# Patient Record
Sex: FEMALE | Race: WHITE
[De-identification: names, ages, dates, MRNs, and addresses within clinical notes are randomized per-mention and may not be internally consistent; named-entity substitution may affect disease eponyms.]

---

## 2023-07-12 ENCOUNTER — HOSPITAL ENCOUNTER (OUTPATIENT)
Dept: HOSPITAL 47 - EC | Age: 58
Setting detail: OBSERVATION
LOS: 1 days | Discharge: HOME | End: 2023-07-13
Attending: INTERNAL MEDICINE | Admitting: INTERNAL MEDICINE
Payer: COMMERCIAL

## 2023-07-12 DIAGNOSIS — Z79.01: ICD-10-CM

## 2023-07-12 DIAGNOSIS — I95.9: ICD-10-CM

## 2023-07-12 DIAGNOSIS — E87.5: ICD-10-CM

## 2023-07-12 DIAGNOSIS — Z98.890: ICD-10-CM

## 2023-07-12 DIAGNOSIS — Z98.891: ICD-10-CM

## 2023-07-12 DIAGNOSIS — F41.9: ICD-10-CM

## 2023-07-12 DIAGNOSIS — Z87.891: ICD-10-CM

## 2023-07-12 DIAGNOSIS — F32.A: ICD-10-CM

## 2023-07-12 DIAGNOSIS — Z79.899: ICD-10-CM

## 2023-07-12 DIAGNOSIS — E87.1: ICD-10-CM

## 2023-07-12 DIAGNOSIS — Z88.5: ICD-10-CM

## 2023-07-12 DIAGNOSIS — E78.5: ICD-10-CM

## 2023-07-12 DIAGNOSIS — R00.1: Primary | ICD-10-CM

## 2023-07-12 DIAGNOSIS — Z87.11: ICD-10-CM

## 2023-07-12 DIAGNOSIS — I10: ICD-10-CM

## 2023-07-12 DIAGNOSIS — F10.11: ICD-10-CM

## 2023-07-12 LAB
ALBUMIN SERPL-MCNC: 4.1 G/DL (ref 3.5–5)
ALP SERPL-CCNC: 95 U/L (ref 38–126)
ALT SERPL-CCNC: 40 U/L (ref 4–34)
ANION GAP SERPL CALC-SCNC: 7 MMOL/L
AST SERPL-CCNC: 42 U/L (ref 14–36)
BASOPHILS # BLD AUTO: 0 K/UL (ref 0–0.2)
BASOPHILS NFR BLD AUTO: 0 %
BUN SERPL-SCNC: 9 MG/DL (ref 7–17)
CALCIUM SPEC-MCNC: 8.8 MG/DL (ref 8.4–10.2)
CHLORIDE SERPL-SCNC: 105 MMOL/L (ref 98–107)
CO2 SERPL-SCNC: 23 MMOL/L (ref 22–30)
EOSINOPHIL # BLD AUTO: 0.1 K/UL (ref 0–0.7)
EOSINOPHIL NFR BLD AUTO: 2 %
ERYTHROCYTE [DISTWIDTH] IN BLOOD BY AUTOMATED COUNT: 4.39 M/UL (ref 3.8–5.4)
ERYTHROCYTE [DISTWIDTH] IN BLOOD: 12.5 % (ref 11.5–15.5)
GLUCOSE SERPL-MCNC: 110 MG/DL (ref 74–99)
HCT VFR BLD AUTO: 40.1 % (ref 34–46)
HGB BLD-MCNC: 13.7 GM/DL (ref 11.4–16)
LYMPHOCYTES # SPEC AUTO: 1.1 K/UL (ref 1–4.8)
LYMPHOCYTES NFR SPEC AUTO: 21 %
MAGNESIUM SPEC-SCNC: 2.1 MG/DL (ref 1.6–2.3)
MCH RBC QN AUTO: 31.1 PG (ref 25–35)
MCHC RBC AUTO-ENTMCNC: 34.1 G/DL (ref 31–37)
MCV RBC AUTO: 91.3 FL (ref 80–100)
MONOCYTES # BLD AUTO: 0.4 K/UL (ref 0–1)
MONOCYTES NFR BLD AUTO: 9 %
NEUTROPHILS # BLD AUTO: 3.3 K/UL (ref 1.3–7.7)
NEUTROPHILS NFR BLD AUTO: 65 %
PLATELET # BLD AUTO: 240 K/UL (ref 150–450)
POTASSIUM SERPL-SCNC: 5.5 MMOL/L (ref 3.5–5.1)
PROT SERPL-MCNC: 7 G/DL (ref 6.3–8.2)
SODIUM SERPL-SCNC: 135 MMOL/L (ref 137–145)
T4 FREE SERPL-MCNC: 1.05 NG/DL (ref 0.78–2.19)
WBC # BLD AUTO: 5.1 K/UL (ref 3.8–10.6)

## 2023-07-12 PROCEDURE — 84439 ASSAY OF FREE THYROXINE: CPT

## 2023-07-12 PROCEDURE — 83735 ASSAY OF MAGNESIUM: CPT

## 2023-07-12 PROCEDURE — 93005 ELECTROCARDIOGRAM TRACING: CPT

## 2023-07-12 PROCEDURE — 36415 COLL VENOUS BLD VENIPUNCTURE: CPT

## 2023-07-12 PROCEDURE — 93306 TTE W/DOPPLER COMPLETE: CPT

## 2023-07-12 PROCEDURE — 80053 COMPREHEN METABOLIC PANEL: CPT

## 2023-07-12 PROCEDURE — 85025 COMPLETE CBC W/AUTO DIFF WBC: CPT

## 2023-07-12 PROCEDURE — 84481 FREE ASSAY (FT-3): CPT

## 2023-07-12 PROCEDURE — 99284 EMERGENCY DEPT VISIT MOD MDM: CPT

## 2023-07-12 PROCEDURE — 84443 ASSAY THYROID STIM HORMONE: CPT

## 2023-07-12 RX ADMIN — DEXTROSE MONOHYDRATE AND SODIUM CHLORIDE SCH MLS/HR: 5; .9 INJECTION, SOLUTION INTRAVENOUS at 16:49

## 2023-07-12 RX ADMIN — ACETAMINOPHEN PRN MG: 325 TABLET, FILM COATED ORAL at 16:53

## 2023-07-12 NOTE — HP
HISTORY AND PHYSICAL



CHIEF COMPLAINT:

Low blood pressure and weakness.



HISTORY OF PRESENT ILLNESS:

This is a 58-year-old woman with a past medical history of multiple medical problems

including alcohol abuse, who was receiving rehab in [facility].  The

patient has high blood pressure.  The patient is taking metoprolol.  The patient is

started on lisinopril.  The patient was noted to have the blood pressure initially as

high as 190, but currently, the patient is complaining of weakness, and the blood

pressure is found to be 111/69 and pulses in the 40s.  The patient was admitted for

further evaluation and treatment.  There is no history of any fever, rigors, or chills

at this time.



PAST MEDICAL HISTORY:

Reviewed includes hypertension and hyperlipidemia.  Rest of the history and rest of the

chart are also reviewed.



HOME MEDICATIONS:

Reviewed include Catapres.  Doses and rest of the medications are reviewed.



ALLERGIES:

Codeine.



FAMILY HISTORY:

No history of heart disease or strokes in the family.



SOCIAL HISTORY:

Previous history of smoking.



REVIEW OF SYSTEMS:

Fourteen-point review is negative except as mentioned earlier.



PHYSICAL EXAMINATION:

VITAL SIGNS:  Pulse is 45, blood pressure 117/70, respirations 16.

HEENT:  Conjunctivae are normal.

NECK:  No jugular venous distention.

CARDIOVASCULAR:  S1 and S2 muffled.

RESPIRATORY:  Breath sounds diminished at the bases.  No rhonchi.  No crackles.

ABDOMEN:  Soft and nontender.  No masses palpable.

LEGS:  No edema.

NERVOUS SYSTEM:  Nonfocal.

SKIN:  No ulcers or rashes.

JOINTS:  No active deforming arthropathy.



LABORATORY DATA:

Reviewed.



ASSESSMENT:

1. Hypotension and bradycardia, possibly secondary to medications.

2. Hyponatremia.

3. Mild hyperkalemia.

4. History of EtOH.

5. Hypertension.

6. Hyperlipidemia.



RECOMMENDATIONS AND DISCUSSION:

In this 58-year-old woman presented with multiple complex medical issues, we will

monitor the patient closely.  I would recommend to avoid beta-blockers, continue with

Norvasc at this time, monitor blood pressure closely, IV fluids, repeat labs in the

morning.  Other than that, I would recommend D5 0.9 at 75 mL/hour and also obtain a

Cardiology consultation.  Further recommendations to follow.  Prognosis is guarded.





MMODL / IJN: [id number] / Job#: [id number]

## 2023-07-12 NOTE — ED
General Adult HPI





- General


Chief complaint: Recheck/Abnormal Lab/Rx


Stated complaint: blood pressure


Time Seen by Provider: 23 09:27


Source: patient, RN notes reviewed


Mode of arrival: ambulatory


Limitations: no limitations





- History of Present Illness


Initial comments: 





Patient is a pleasant 58-year-old female presenting to the emergency department 

with concerns for blood pressure and heart rate problems.  Patient does have 

history of chronic high blood pressure and does take metoprolol.  Patient has 

been at [facility] for the past 9 days for alcohol problems.  Patient was 

recently started on lisinopril while there.  This morning patient did take her 

medications.  Patient was found to have low heart rate and low blood pressure.  

Patient states several days ago her blood pressure was actually high, as high as

190 systolic.  Patient admits to feeling somewhat lightheaded at times however 

states symptoms are minimal.





- Related Data


                                    Allergies











Allergy/AdvReac Type Severity Reaction Status Date / Time


 


codeine AdvReac  Unknown Verified 23 09:25














Review of Systems


ROS Statement: 


Those systems with pertinent positive or pertinent negative responses have been 

documented in the HPI.





ROS Other: All systems not noted in ROS Statement are negative.


Constitutional: Denies: fever


Eyes: Denies: eye pain


ENT: Denies: ear pain


Respiratory: Denies: cough


Cardiovascular: Denies: chest pain, palpitations


Endocrine: Denies: fatigue


Gastrointestinal: Denies: abdominal pain


Genitourinary: Denies: dysuria


Musculoskeletal: Denies: back pain


Skin: Denies: rash


Neurological: Denies: headache, weakness, confusion





Past Medical History


Past Medical History: Hyperlipidemia, Hypertension


Additional Past Medical History / Comment(s): hx bleeding stomach ulcer


Past Surgical History:  Section


Additional Past Surgical History / Comment(s): bleeding ulcer repair


Past Psychological History: Anxiety, Depression


Smoking Status: Former smoker


Past Alcohol Use History: Heavy


Past Drug Use History: None Reported





General Exam


Limitations: no limitations


General appearance: alert, in no apparent distress


Head exam: Present: normocephalic


Eye exam: Present: normal appearance


ENT exam: Present: normal oropharynx


Neck exam: Present: normal inspection


Respiratory exam: Present: normal lung sounds bilaterally


Cardiovascular Exam: Present: bradycardia


GI/Abdominal exam: Present: soft.  Absent: tenderness


Extremities exam: Present: normal inspection.  Absent: pedal edema, calf 

tenderness


Neurological exam: Present: alert, oriented X3, CN II-XII intact.  Absent: motor

sensory deficit


  ** Expanded


Cranial nerves: EOM's Intact: Normal


Motor strength exam: RUE: 5, LUE: 5, RLE: 5, LLE: 5


Eye Response: (4) open spontaneously


Motor Response: (6) obeys commands


Verbal Response: (5) oriented


Psychiatric exam: Present: normal affect, normal mood


Skin exam: Present: normal color





Course


                                   Vital Signs











  23





  09:15 09:49 10:00


 


Temperature 98.0 F  


 


Pulse Rate 46 L 48 L 43 L


 


Respiratory 20 16 16





Rate   


 


Blood Pressure 111/69 144/91 144/91


 


O2 Sat by Pulse 96 97 98





Oximetry   














  23





  10:30 10:38 10:41


 


Temperature   


 


Pulse Rate 45 L 16 L 48 L


 


Respiratory 16  16





Rate   


 


Blood Pressure 117/72  97/67


 


O2 Sat by Pulse 96 100 95





Oximetry   














  23





  10:45 11:00 11:15


 


Temperature   


 


Pulse Rate 45 L 42 L 43 L


 


Respiratory 14 14 16





Rate   


 


Blood Pressure 97/67 97/67 147/79


 


O2 Sat by Pulse 96 96 95





Oximetry   














  23





  11:18 11:30 12:00


 


Temperature   


 


Pulse Rate 43 L 44 L 47 L


 


Respiratory 16 16 16





Rate   


 


Blood Pressure  157/86 138/87


 


O2 Sat by Pulse  95 98





Oximetry   














EKG Findings





- EKG Results:


EKG: interpreted by ERMD, sinus rhythm, normal axis, normal QRS, normal ST/T


EKG shows: bradycardia





Medical Decision Making





- Medical Decision Making





Was pt. sent in by a medical professional or institution (, PA, NP, urgent 

care, hospital, or nursing home...) When possible be specific


@  -Patient was sent from [facility]


Did you speak to anyone other than the patient for history (EMS, parent, family,

police, friend...)? What history was obtained from this source 


@  -No


Did you review nursing and triage notes (agree or disagree)?  Why? 


@  -I reviewed and agree with nursing and triage notes


Were old charts reviewed (outside hosp., previous admission, EMS record, old 

EKG, old radiological studies, urgent care reports/EKG's, nursing home records)?

Report findings 


@  -Review transfer chart from [facility]


Differential Diagnosis (chest pain, altered mental status, abdominal pain women,

abdominal pain men, vaginal bleeding, weakness, fever, dyspnea, syncope, 

headache, dizziness, GI bleed, back pain, seizure, CVA, palpatations, mental 

health, musculoskeletal)? 


@  -not applicable


EKG interpreted by me (3pts min.).


@  -As above


X-rays interpreted by me (1pt min.).


@  -None done


CT interpreted by me (1pt min.).


@  -None done


U/S interpreted by me (1pt. min.).


@  -None done


What testing was considered but not performed or refused? (CT, X-rays, U/S, 

labs)? Why?


@  -None


What meds were considered but not given or refused? Why?


@  -None


Did you discuss the management of the patient with other professionals 

(professionals i.e. , PA, NP, lab, RT, psych nurse, , , 

teacher, , )? Give summary


@  -Case was discussed with Dr. Flores, who will admit for hospital call


Was smoking cessation discussed for >3mins.?


@  -No


Was critical care preformed (if so, how long)?


@  -No


Were there social determinants of health that impacted care today? How? 

(Homelessness, low income, unemployed, alcoholism, drug addiction, 

transportation, low edu. Level, literacy, decrease access to med. care, half-way, 

rehab)?


@  -No


Was there de-escalation of care discussed even if they declined (Discuss DNR or 

withdrawal of care, Hospice)? DNR status


@  -No


What co-morbidities impacted this encounter? (DM, HTN, Smoking, COPD, CAD, 

Cancer, CVA, ARF, Chemo, Hep., AIDS, mental health diagnosis, sleep apnea, 

morbid obesity)?


@  -None


Was patient admitted / discharged? Hospital course, mention meds given and 

route, prescriptions, significant lab abnormalities, going to OR and other 

pertinent info.


@  -Blood pressure stabilized however patient remains bradycardic.  Patient is 

unclear normal heart rate and nothing on chart.  Patient remains in the mid 40s.

 Patient will be held for observation.  Beta blocker will be held.  Cardiology 

will be placed on consult 


Undiagnosed new problem with uncertain prognosis?


@  -No


Drug Therapy requiring intensive monitoring for toxicity (Heparin, Nitro, 

Insulin, Cardizem)?


@  -No


Were any procedures done?


@  -No


Diagnosis/symptom?


@  -Bradycardia, hypotension


Acute, or Chronic, or Acute on Chronic?


@  -Acute, acute


Uncomplicated (without systemic symptoms) or Complicated (systemic symptoms)?


@  -default


Side effects of treatment?


@  -No


Exacerbation, Progression, or Severe Exacerbation?


@  -No


Poses a threat to life or bodily function? How? (Chest pain, USA, MI, pneumonia,

PE, COPD, DKA, ARF, appy, cholecystitis, CVA, Diverticulitis, Homicidal, 

Suicidal, threat to staff... and all critical care pts)


@  -No





- Lab Data


Result diagrams: 


                                 23 09:40





                                 23 09:40


                                   Lab Results











  23 Range/Units





  09:40 09:40 


 


WBC  5.1   (3.8-10.6)  k/uL


 


RBC  4.39   (3.80-5.40)  m/uL


 


Hgb  13.7   (11.4-16.0)  gm/dL


 


Hct  40.1   (34.0-46.0)  %


 


MCV  91.3   (80.0-100.0)  fL


 


MCH  31.1   (25.0-35.0)  pg


 


MCHC  34.1   (31.0-37.0)  g/dL


 


RDW  12.5   (11.5-15.5)  %


 


Plt Count  240   (150-450)  k/uL


 


MPV  8.8   


 


Neutrophils %  65   %


 


Lymphocytes %  21   %


 


Monocytes %  9   %


 


Eosinophils %  2   %


 


Basophils %  0   %


 


Neutrophils #  3.3   (1.3-7.7)  k/uL


 


Lymphocytes #  1.1   (1.0-4.8)  k/uL


 


Monocytes #  0.4   (0-1.0)  k/uL


 


Eosinophils #  0.1   (0-0.7)  k/uL


 


Basophils #  0.0   (0-0.2)  k/uL


 


Sodium   135 L  (137-145)  mmol/L


 


Potassium   5.5 H  (3.5-5.1)  mmol/L


 


Chloride   105  ()  mmol/L


 


Carbon Dioxide   23  (22-30)  mmol/L


 


Anion Gap   7  mmol/L


 


BUN   9  (7-17)  mg/dL


 


Creatinine   0.64  (0.52-1.04)  mg/dL


 


Est GFR (CKD-EPI)AfAm   >90  (>60 ml/min/1.73 sqM)  


 


Est GFR (CKD-EPI)NonAf   >90  (>60 ml/min/1.73 sqM)  


 


Glucose   110 H  (74-99)  mg/dL


 


Calcium   8.8  (8.4-10.2)  mg/dL


 


Magnesium   2.1  (1.6-2.3)  mg/dL


 


Total Bilirubin   1.0  (0.2-1.3)  mg/dL


 


AST   42 H  (14-36)  U/L


 


ALT   40 H  (4-34)  U/L


 


Alkaline Phosphatase   95  ()  U/L


 


Total Protein   7.0  (6.3-8.2)  g/dL


 


Albumin   4.1  (3.5-5.0)  g/dL


 


TSH   3.050  (0.465-4.680)  mIU/L


 


Free T4   1.05  (0.78-2.19)  ng/dL


 


Free T3 pg/mL   4.9  (2.8-5.3)  pg/ml














Disposition


Clinical Impression: 


 Bradycardia, Hypotension





Disposition: ADMITTED AS IP TO THIS HOSP


Is patient prescribed a controlled substance at d/c from ED?: No


Referrals: 


None,Stated [Primary Care Provider] - 1-2 days


Time of Disposition: 12:31

## 2023-07-13 VITALS — RESPIRATION RATE: 16 BRPM

## 2023-07-13 VITALS — DIASTOLIC BLOOD PRESSURE: 73 MMHG | HEART RATE: 55 BPM | SYSTOLIC BLOOD PRESSURE: 134 MMHG | TEMPERATURE: 98.4 F

## 2023-07-13 LAB
ALBUMIN SERPL-MCNC: 4 D/DL (ref 3.8–4.9)
ALBUMIN/GLOB SERPL: 1.74 RATIO (ref 1.6–3.17)
ALP SERPL-CCNC: 100 U/L (ref 41–126)
ALT SERPL-CCNC: 35 U/L (ref 8–44)
ANION GAP SERPL CALC-SCNC: 11.5 MMOL/L (ref 4–12)
AST SERPL-CCNC: 18 U/L (ref 13–35)
BASOPHILS # BLD AUTO: 0.05 X 10*3/UL (ref 0–0.1)
BASOPHILS NFR BLD AUTO: 1.2 %
BUN SERPL-SCNC: 8 MG/DL (ref 9–27)
BUN/CREAT SERPL: 10 RATIO (ref 12–20)
CALCIUM SPEC-MCNC: 9.3 MG/DL (ref 8.7–10.3)
CHLORIDE SERPL-SCNC: 108 MMOL/L (ref 96–109)
CO2 SERPL-SCNC: 25.5 MMOL/L (ref 21.6–31.8)
EOSINOPHIL # BLD AUTO: 0.08 X 10*3/UL (ref 0.04–0.35)
EOSINOPHIL NFR BLD AUTO: 1.9 %
ERYTHROCYTE [DISTWIDTH] IN BLOOD BY AUTOMATED COUNT: 4.57 X 10*6/UL (ref 4.1–5.2)
ERYTHROCYTE [DISTWIDTH] IN BLOOD: 12.1 % (ref 11.5–14.5)
GLOBULIN SER CALC-MCNC: 2.3 D/DL (ref 1.6–3.3)
GLUCOSE SERPL-MCNC: 119 MG/DL (ref 70–110)
HCT VFR BLD AUTO: 43.3 % (ref 37.2–46.3)
HGB BLD-MCNC: 14.3 D/DL (ref 12–15)
IMM GRANULOCYTES BLD QL AUTO: 0.2 %
LYMPHOCYTES # SPEC AUTO: 1.11 X 10*3/UL (ref 0.9–5)
LYMPHOCYTES NFR SPEC AUTO: 26.2 %
MCH RBC QN AUTO: 31.3 PG (ref 27–32)
MCHC RBC AUTO-ENTMCNC: 33 D/DL (ref 32–37)
MCV RBC AUTO: 94.7 FL (ref 80–97)
MONOCYTES # BLD AUTO: 0.53 X 10*3/UL (ref 0.2–1)
MONOCYTES NFR BLD AUTO: 12.5 %
NEUTROPHILS # BLD AUTO: 2.45 X 10*3/UL (ref 1.8–7.7)
NEUTROPHILS NFR BLD AUTO: 58 %
NRBC BLD AUTO-RTO: 0 X 10*3/UL (ref 0–0.01)
PLATELET # BLD AUTO: 260 X 10*3/UL (ref 140–440)
POTASSIUM SERPL-SCNC: 4.3 MMOL/L (ref 3.5–5.5)
PROT SERPL-MCNC: 6.3 D/DL (ref 6.2–8.2)
SODIUM SERPL-SCNC: 145 MMOL/L (ref 135–145)
WBC # BLD AUTO: 4.23 X 10*3/UL (ref 4.5–10)

## 2023-07-13 RX ADMIN — DEXTROSE MONOHYDRATE AND SODIUM CHLORIDE SCH: 5; .9 INJECTION, SOLUTION INTRAVENOUS at 12:12

## 2023-07-13 RX ADMIN — ACETAMINOPHEN PRN MG: 325 TABLET, FILM COATED ORAL at 12:10

## 2023-07-13 NOTE — CA
Transthoracic Echo Report 

 Name: Michela Gorman 

 MRN:    [id number] 

 Age:    58     Gender:     F 

 

 :    1965 

 Exam Date:     2023 12:29 

 Exam Location: [facility] 

 Ht (in):     62     Wt (lb):     143 

 Ordering Physician:        Trinity Plummer 

 Attending/Referring Phys:         [id number], Elian 

 Technician         Mahi Berman RDCS 

 Procedure CPT: 

 Indications:       LVF 

 

 Cardiac Hx: 

 Technical Quality:      Fair 

 Contrast 1:                                Total Dose (mL): 

 Contrast 2:                                Total Dose (mL): 

 

 MEASUREMENTS  (Male / Female) Normal Values 

 2D ECHO 

 LV Diastolic Diameter PLAX        3.6 cm                4.2 - 5.9 / 3.9 - 5.3 cm 

 LV Systolic Diameter PLAX         2.3 cm                 

 IVS Diastolic Thickness           1.5 cm                0.6 - 1.0 / 0.6 - 0.9 cm 

 LVPW Diastolic Thickness          1.4 cm                0.6 - 1.0 / 0.6 - 0.9 cm 

 LV Relative Wall Thickness        0.8                    

 RV Internal Dim ED PLAX           3.3 cm                 

 LA Volume                         44.4 cm???              18 - 58 / 22 - 52 cm??? 

 

 M-MODE 

 Aortic Root Diameter MM           3.2 cm                 

 LA Systolic Diameter MM           4.4 cm                 

 LA Ao Ratio MM                    1.4                    

 AV Cusp Separation MM             2.0 cm                 

 

 DOPPLER 

 AV Peak Velocity                  147.0 cm/s             

 AV Peak Gradient                  8.6 mmHg               

 AV Mean Velocity                  90.4 cm/s              

 AV Mean Gradient                  3.8 mmHg               

 AV Velocity Time Integral         33.4 cm                

 LVOT Peak Velocity                119.2 cm/s             

 LVOT Peak Gradient                5.7 mmHg               

 LVOT Velocity Time Integral       29.3 cm                

 MV Area PHT                       2.7 cm???                

 Mitral E Point Velocity           82.4 cm/s              

 Mitral A Point Velocity           112.4 cm/s             

 Mitral E to A Ratio               0.7                    

 MV Deceleration Time              276.3 ms               

 MV E' Velocity                    8.8 cm/s               

 Mitral E to MV E' Ratio           9.3                    

 TR Peak Velocity                  324.3 cm/s             

 TR Peak Gradient                  42.1 mmHg              

 Right Ventricular Systolic Press  44.8 mmHg              

 

 

 FINDINGS 

 Left Ventricle 

 Moderately increased left ventricular wall thickness. Left ventricular cavity  

 size normal. Normal left ventricular systolic function with no obvious regional  

 wall motion abnormalities. Abnormal left ventricular diastolic filling pattern.  

 Left ventricular ejection fraction is estimated at 55-60 %. 

 

 Right Ventricle 

 Normal right ventricular size and function. Mild pulmonary hypertension. 

 

 Right Atrium 

 Normal right atrial size. 

 

 Left Atrium 

 Normal left atrial size. 

 

 Mitral Valve 

 Structurally normal mitral valve. No mitral stenosis. Mild mitral  

 regurgitation. 

 

 Aortic Valve 

 Trileaflet aortic valve. No aortic valve stenosis or regurgitation. 

 

 Tricuspid Valve 

 Structurally normal tricuspid valve. Mild-to-moderate tricuspid regurgitation. 

 

 Pulmonic Valve 

 Structurally normal pulmonic valve. Trace pulmonic regurgitation. 

 

 Pericardium 

 No pericardial effusion. 

 

 Aorta 

 Normal size aortic root and proximal ascending aorta. 

 

 CONCLUSIONS 

 Normal LV systolic function 

 Mild to moderate tricuspid regurgitation 

 Previewed by:        Julián Higgins MD Dr. Suresh Tumma MD 

 (Electronically Signed) 

 Final Date:      2023 16:48

## 2023-07-13 NOTE — P.CRDCN
History of Present Illness


Consult date: 23


Reason for Consult (text): 





Bradycardia, hypotension


History of present illness: 





History of present illness:


This is a 58-year-old female with no previous cardiac history, does not follow 

with a cardiologist.  Patient has a past medical history of hypertension, 

hyperlipidemia, alcohol abuse.  Patient is currently at [facility] for alcohol

abuse.  She denies any drug use.  She was started on blood pressure medications 

and subsequently she was found to have a low heart rate in the low blood 

pressure and was sent into the hospital for further evaluation.  Patient denies 

having any chest pain.  Shortness of breath.  She is a nonsmoker.  She has a 

family history on her mom's side with coronary artery disease status post CABG. 

Telemetry is sinus bradycardia in the 40s.  Blood pressures been stable.





EKG sinus bradycardia


Potassium initially 5. 5 repeat 4.3.  CBC is unremarkable.  BUN 8 creatinine 

0.8.  Liver function tests today are normal.  TSH 3.05.


Home cardiac medications: Amlodipine 5 mg daily, atorvastatin 20 mg at bedtime, 

Catapres 0.1-0.3 mg every 4 hours as needed.





Review Of Systems:


At the time of my evaluation:


Constitutional: No fever, no chills.  No weakness, fatigue or lethargy. 


EENT: No headache.  No dizziness. 


Lungs: No shortness of breath, cough, no sputum production.  No wheezing.


Cardiovascular: No chest pain, no lower extremity edema.  No palpitations.  No 

paroxysmal nocturnal dyspnea.  No orthopnea.  No lightheadedness or dizziness.  

No syncopal episodes.


Abdominal: No abdominal pain.  No nausea, vomiting.  No diarrhea.  No 

constipation.  No bloody or tarry stools.  


Genitourinary: No dysuria..  No urinary retention.


Musculoskeletal: No myalgias.  No muscle weakness, no frequent falls.  No back 

pain.  No neck pain.


Integumentary: No wounds.  No rash.  No unusual bruising.


Neurologic: No aphasia. No facial droop. No change in mentation. No head injury.

No headache. 








Physical examination:


Gen: This is a 38-year-old  female.  She is resting in a chair and 

appears to be comfortable and in no acute distress.


VS: reviewed.  Heart rate in the 40s, blood pressure 139/72.


HEENT: Head is atraumatic, normocephalic. Pupils equal, round. Sclerae is 

anicteric. 


NECK: Supple. No JVD. . 


LUNGS: Clear to auscultation. No wheezes or rhonchi.  No intercostal 

retractions.


HEART: Regular rate and rhythm.  Systolic ejection murmur at the right sternal 

border. 


ABDOMEN: Soft  No tenderness.


EXTREMITIES: No pedal edema.  No calf tenderness.


NEUROLOGICAL: Patient is awake, alert and oriented x3.


 





Assessment:


Asymptomatic bradycardia


Hypotension


Hyper kalemia


History of Hypertension


Hyperlipidemia


Alcohol abuse





Plan:


Discontinue lisinopril as Ace inhibitors may cause hyperkalemia


Resume other home cardiac medications


Obtain 2-D echocardiogram and Doppler study to assess cardiac structure and 

function


If echocardiogram is unremarkable, patient is cleared for discharge back to 

[facility]


Thank you kindly for this consultation.





Nurse practitioner note has been reviewed, I agree with documented findings and 

plan of care.  Patient was seen and examined.





Past Medical History


Past Medical History: Hyperlipidemia, Hypertension


Additional Past Medical History / Comment(s): hx bleeding stomach ulcer, 

esophageal surgery


History of Any Multi-Drug Resistant Organisms: None Reported


Past Surgical History:  Section


Additional Past Surgical History / Comment(s): bleeding ulcer repair


Past Psychological History: Anxiety, Depression


Smoking Status: Former smoker


Past Alcohol Use History: Heavy


Additional Past Alcohol Use History / Comment(s): currently at [facility] for 

ETOH abuse, no consumption in 10 days


Past Drug Use History: None Reported





Medications and Allergies


                                Home Medications











 Medication  Instructions  Recorded  Confirmed  Type


 


Acetaminophen Tab [Tylenol] 650 mg PO QID PRN 23 History


 


Atorvastatin [Lipitor] 20 mg PO HS 23 History


 


Chlorpheniramine Maleate 4 mg PO Q4H PRN MDD 4 doses 23 History





[Chlor-Trimeton]    


 


Docusate [Colace] 100 mg PO BID PRN 23 History


 


Ibuprofen [Motrin Ib] 600 mg PO Q6H PRN 23 History


 


Magnesium Hydroxide [Milk of 2,400 mg PO BID PRN 23 History





Magnesia]    


 


busPIRone HCl [Buspar] 10 mg PO TID 23 History


 


cloNIDine HCL [Catapres] 0.1 - 0.3 mg PO Q4H PRN 23 History


 


amLODIPine [Norvasc] 10 mg PO DAILY@0600 #30 tab 23  Rx


 


traZODone HCL [Desyrel] 100 mg PO HS  tab 23  Rx








                                    Allergies











Allergy/AdvReac Type Severity Reaction Status Date / Time


 


codeine Allergy  Unknown Verified 23 12:51














Physical Exam


Vitals: 


                                   Vital Signs











  Temp Pulse Pulse Resp BP BP Pulse Ox


 


 23 05:35       145/81 


 


 23 02:03  97.7 F   50 L  18   135/68  96


 


 23 18:55  97.9 F   45 L  16   154/75  96


 


 23 18:19    45 L    


 


 23 17:47  98.1 F   47 L  16   140/69  98


 


 23 17:38  98.4 F  48 L   18  134/76   95


 


 23 15:00   46 L   18  130/108   95


 


 23 12:00   47 L   16  138/87   98


 


 23 11:30   44 L   16  157/86   95


 


 23 11:18   43 L   16   


 


 23 11:15   43 L   16  147/79   95


 


 23 11:00   42 L   14  97/67   96


 


 23 10:45   45 L   14  97/67   96


 


 23 10:41   48 L   16  97/67   95


 


 23 10:38   16 L      100


 


 23 10:30   45 L   16  117/72   96


 


 23 10:00   43 L   16  144/91   98


 


 23 09:49   48 L   16  144/91   97


 


 23 09:15  98.0 F  46 L   20  111/69   96








                                Intake and Output











 23





 22:59 06:59 14:59


 


Other:   


 


  Voiding Method Toilet  


 


  # Voids 1 3 


 


  Weight 64.864 kg  














Results





                                 23 06:02





                                 23 06:02


                                 Cardiac Enzymes











  23 Range/Units





  09:40 


 


AST  42 H  (14-36)  U/L








                                       CBC











  23 Range/Units





  09:40 


 


WBC  5.1  (3.8-10.6)  k/uL


 


RBC  4.39  (3.80-5.40)  m/uL


 


Hgb  13.7  (11.4-16.0)  gm/dL


 


Hct  40.1  (34.0-46.0)  %


 


Plt Count  240  (150-450)  k/uL








                          Comprehensive Metabolic Panel











  23 Range/Units





  09:40 


 


Sodium  135 L  (137-145)  mmol/L


 


Potassium  5.5 H  (3.5-5.1)  mmol/L


 


Chloride  105  ()  mmol/L


 


Carbon Dioxide  23  (22-30)  mmol/L


 


BUN  9  (7-17)  mg/dL


 


Creatinine  0.64  (0.52-1.04)  mg/dL


 


Glucose  110 H  (74-99)  mg/dL


 


Calcium  8.8  (8.4-10.2)  mg/dL


 


AST  42 H  (14-36)  U/L


 


ALT  40 H  (4-34)  U/L


 


Alkaline Phosphatase  95  ()  U/L


 


Total Protein  7.0  (6.3-8.2)  g/dL


 


Albumin  4.1  (3.5-5.0)  g/dL








                               Current Medications











Generic Name Dose Route Start Last Admin





  Trade Name Freq  PRN Reason Stop Dose Admin


 


Acetaminophen  650 mg  23 12:32  23 16:53





  Acetaminophen Tab 325 Mg Tab  PO   650 mg





  Q6HR PRN   Administration





  Mild Pain or Fever > 100.5  


 


Amlodipine Besylate  10 mg  23 06:00 





  Amlodipine 10 Mg Tab  PO  





  DAILY@0600 JULI  


 


Atorvastatin Calcium  20 mg  23 21:00  23 22:02





  Atorvastatin 20 Mg Tab  PO   20 mg





  HS JULI   Administration


 


Buspirone HCl  10 mg  23 16:00  23 08:11





  Buspirone Hcl 10 Mg Tab  PO   10 mg





  TID JULI   Administration


 


Diphenhydramine HCl  25 mg  23 14:37 





  Diphenhydramine 25 Mg Cap  PO  





  Q4H PRN  





  Allergy Symptoms  


 


Docusate Sodium  100 mg  23 13:35 





  Docusate 100 Mg Cap  PO  





  BID PRN  





  Constipation  


 


Hydralazine HCl  10 mg  23 14:37 





  Hydralazine Hcl 20 Mg/Ml 1 Ml Vial  IVP  





  Q4HR PRN  





  Blood Pressure - High  


 


Dextrose/Sodium Chloride  1,000 mls @ 50 mls/hr  23 14:45  23 16:49





  Dextrose 5%-Ns Iv Soln  IV   50 mls/hr





  .Q20H JULI   Administration


 


Ibuprofen  600 mg  23 13:35 





  Ibuprofen 600 Mg Tab  PO  





  Q6H PRN  





  Pain  


 


Magnesium Hydroxide  2,400 mg  23 13:35 





  Magnesium Hydroxide 2,400 Mg/30 Ml Cup  PO  





  BID PRN  





  Constipation  


 


Naloxone HCl  0.2 mg  23 12:32 





  Naloxone 0.4 Mg/Ml 1 Ml Vial  IV  





  Q2M PRN  





  Opioid Reversal  


 


Trazodone HCl  100 mg  23 21:00  23 22:02





  Trazodone Hcl 100 Mg Tab  PO   100 mg





  HS JULI   Administration








                                Intake and Output











 23





 22:59 06:59 14:59


 


Other:   


 


  Voiding Method Toilet  


 


  # Voids 1 3 


 


  Weight 64.864 kg  








                                        





                                 23 09:40 





                                 23 09:40